# Patient Record
Sex: FEMALE | Race: WHITE
[De-identification: names, ages, dates, MRNs, and addresses within clinical notes are randomized per-mention and may not be internally consistent; named-entity substitution may affect disease eponyms.]

---

## 2021-08-07 ENCOUNTER — HOSPITAL ENCOUNTER (EMERGENCY)
Dept: HOSPITAL 52 - LL.ED | Age: 56
Discharge: HOME | End: 2021-08-07
Payer: COMMERCIAL

## 2021-08-07 DIAGNOSIS — R10.13: Primary | ICD-10-CM

## 2021-08-07 DIAGNOSIS — Z79.899: ICD-10-CM

## 2021-08-07 LAB
ANION GAP SERPL CALC-SCNC: 9.9 MEQ/L (ref 7–15)
CHLORIDE SERPL-SCNC: 107 MMOL/L (ref 98–107)
SODIUM SERPL-SCNC: 140 MMOL/L (ref 136–145)

## 2021-08-07 NOTE — EDM.PDOC
ED HPI GENERAL MEDICAL PROBLEM





- General


Chief Complaint: Abdominal Pain


Stated Complaint: Abdominal Pain


Time Seen by Provider: 08/07/21 16:25


Source of Information: Reports: Patient


History Limitations: Reports: No Limitations





- History of Present Illness


INITIAL COMMENTS - FREE TEXT/NARRATIVE: 





Pt. presents to ER with acute onset intermittent abdominal pain. Pt. states that

the discomfort started shortly before arrival to ER. She states that she has 

never had this pain before. Pt. states that she had a regular BM after pain 

started and feels that this helped intermittently, then it came back. Denies any

constipation, diarrhea, dark/tarry, or bloody stools. 


Pt. denies any wen colored stools or dark urine.


Pt. denies any chest pain, shortness of breath, fever or chills. Denies any 

history of previous abdominal surgeries.


She reported on my arrival in ER that pain was resolving on its own shortly 

after arriving to ER. 


Onset: Today


Onset Date: 08/07/21


Location: Reports: Abdomen


Associated Symptoms: Denies: Chest Pain, Cough, Diaphoresis, Fever/Chills, 

Malaise, Nausea/Vomiting, Shortness of Breath, Syncope, Weakness





- Related Data


                                    Allergies











Allergy/AdvReac Type Severity Reaction Status Date / Time


 


No Known Allergies Allergy   Verified 08/07/21 16:18











Home Meds: 


                                    Home Meds





Ibuprofen [Advil] 400 mg PO Q6HR PRN 06/16/14 [History]


amLODIPine Besylate [Amlodipine Besylate] 10 mg PO DAILY 08/07/21 [History]


lisinopriL [Lisinopril] 10 mg PO BID 08/07/21 [History]











Social & Family History





- Tobacco Use


Tobacco Use Status *Q: Unknown Ever Used Tobacco





ED ROS GENERAL





- Review of Systems


Review Of Systems: See Below


Constitutional: Reports: No Symptoms


HEENT: Reports: No Symptoms


Respiratory: Reports: No Symptoms


Cardiovascular: Reports: No Symptoms


Endocrine: Reports: No Symptoms


GI/Abdominal: Reports: Abdominal Pain.  Denies: Black Stool, Bloody Stool, 

Constipation, Diarrhea, Difficulty Swallowing, Distension, Hematemesis, 

Hematochezia, Melena


: Reports: No Symptoms


Musculoskeletal: Reports: No Symptoms


Skin: Reports: No Symptoms


Neurological: Reports: No Symptoms


Psychiatric: Reports: No Symptoms


Hematologic/Lymphatic: Reports: No Symptoms


Immunologic: Reports: No Symptoms





ED EXAM, GENERAL





- Physical Exam


Exam: See Below


Exam Limited By: No Limitations


General Appearance: Alert, WD/WN, No Apparent Distress


Respiratory/Chest: No Respiratory Distress, Lungs Clear, Normal Breath Sounds, 

No Accessory Muscle Use, Chest Non-Tender


Cardiovascular: Normal Peripheral Pulses, Regular Rate, Rhythm, No Edema, No JVD


Peripheral Pulses: 4+: Radial (L)


GI/Abdominal: Soft, No Organomegaly, No Distention, No Mass, Other (Pain about a

 1 on arrival to ER, not made worse with palpation.).  No: Hepatomegaly, 

Splenomegaly


 (Female) Exam: Deferred


Rectal (Female) Exam: No: Deferred


Extremities: Normal Inspection, Normal Range of Motion, Non-Tender, No Pedal 

Edema, Normal Capillary Refill


Neurological: Alert, Oriented, CN II-XII Intact, Normal Cognition, Normal 

Reflexes, No Motor/Sensory Deficits


Psychiatric: Normal Affect, Normal Mood


Skin Exam: Warm, Dry, Intact, Normal Color, No Rash


Lymphatic: No Adenopathy





Course





- Vital Signs


Last Recorded V/S: 





                                Last Vital Signs











Temp  37.1 C   08/07/21 16:25


 


Pulse  92   08/07/21 16:55


 


Resp  16   08/07/21 16:25


 


BP  152/88 H  08/07/21 16:55


 


Pulse Ox  100   08/07/21 16:25














- Orders/Labs/Meds


Orders: 





                               Active Orders 24 hr











 Category Date Time Status


 


 Gallbladder [Abdomen Ltd] [US] Stat Exams  08/07/21 17:17 Ordered


 


 Peripheral IV Insertion Adult [OM.PC] Routine Oth  08/07/21 16:29 Ordered











Labs: 





                                Laboratory Tests











  08/07/21 08/07/21 08/07/21 Range/Units





  16:40 16:40 16:40 


 


WBC  8.1    (4.0-10.2)  K/uL


 


RBC  4.34    (3.77-5.09)  M/uL


 


Hgb  13.0    (11.7-15.5)  g/dL


 


Hct  39.5    (34.0-46.0)  %


 


MCV  91.0    (84.0-98.0)  fL


 


MCH  30.0    (28.2-33.3)  pg


 


MCHC  32.9    (31.7-36.0)  g/dL


 


RDW  13.6    (11.2-14.1)  %


 


Plt Count  241    (150-350)  K/uL


 


Neut % (Auto)  65.9    (45.0-80.0)  %


 


Lymph % (Auto)  25.4    (10.0-50.0)  %


 


Mono % (Auto)  7.3    (2.0-14.0)  %


 


Eos % (Auto)  1.0    (0.0-5.0)  %


 


Baso % (Auto)  0.4    (0.0-2.0)  %


 


Neut # (Auto)  5.35    (1.40-7.00)  K/uL


 


Lymph # (Auto)  2.06    (0.50-3.50)  K/uL


 


Mono # (Auto)  0.59    (0.00-1.00)  K/uL


 


Eos # (Auto)  0.08    (0.00-0.50)  K/uL


 


Baso # (Auto)  0.03    (0.00-0.20)  K/uL


 


PT   9.3 L   (9.5-12.0)  SEC


 


INR   0.9   


 


APTT     (24.5-32.8)  SEC


 


Sodium    140  (136-145)  mmol/L


 


Potassium    3.6  (3.5-5.1)  mmol/L


 


Chloride    107  ()  mmol/L


 


Carbon Dioxide    23.1  (21.0-32.0)  mmol/L


 


Anion Gap    9.9  (7-15)  meq/L


 


BUN    11  (7-18)  mg/dL


 


Creatinine    1.25 H  (0.51-1.17)  mg/dL


 


Est Cr Clr Drug Dosing    TNP  


 


Estimated GFR (MDRD)    44  mL/min


 


Glucose    122 H  (70-99)  mg/dL


 


Lactic Acid     (0.4-2.0)  mmol/L


 


Calcium    8.6  (8.5-10.1)  mg/dL


 


Phosphorus    1.9 L  (2.6-4.7)  mg/dL


 


Magnesium    2.0  (1.8-2.4)  mg/dL


 


Total Bilirubin    0.5  (0.2-1.0)  mg/dL


 


AST    70 H  (15-37)  U/L


 


ALT    43  (12-78)  U/L


 


Alkaline Phosphatase    97  ()  IU/L


 


Troponin I High Sens    5  (<=51)  ng/L


 


C-Reactive Protein    1.1 H  (<=0.9)  mg/dL


 


Total Protein    7.7  (6.4-8.2)  g/dL


 


Albumin    3.6  (3.4-5.0)  g/dL


 


Amylase    40  ()  U/L


 


Lipase     ()  U/L


 


Specimen Type     


 


Urine Color     


 


Urine Appearance     


 


Urine pH     (5.0-9.0)  


 


Ur Specific Gravity     (1.005-1.030)  


 


Urine Protein     (NEGATIVE)  mg/dL


 


Urine Glucose (UA)     (NEGATIVE)  mg/dL


 


Urine Ketones     (NEGATIVE)  mg/dL


 


Urine Occult Blood     (NEGATIVE)  


 


Urine Nitrite     (NEGATIVE)  


 


Urine Bilirubin     (NEGATIVE)  


 


Urine Urobilinogen     (0.2-1.0)  E.U./dL


 


Ur Leukocyte Esterase     (NEGATIVE)  














  08/07/21 08/07/21 08/07/21 Range/Units





  16:40 16:40 16:40 


 


WBC     (4.0-10.2)  K/uL


 


RBC     (3.77-5.09)  M/uL


 


Hgb     (11.7-15.5)  g/dL


 


Hct     (34.0-46.0)  %


 


MCV     (84.0-98.0)  fL


 


MCH     (28.2-33.3)  pg


 


MCHC     (31.7-36.0)  g/dL


 


RDW     (11.2-14.1)  %


 


Plt Count     (150-350)  K/uL


 


Neut % (Auto)     (45.0-80.0)  %


 


Lymph % (Auto)     (10.0-50.0)  %


 


Mono % (Auto)     (2.0-14.0)  %


 


Eos % (Auto)     (0.0-5.0)  %


 


Baso % (Auto)     (0.0-2.0)  %


 


Neut # (Auto)     (1.40-7.00)  K/uL


 


Lymph # (Auto)     (0.50-3.50)  K/uL


 


Mono # (Auto)     (0.00-1.00)  K/uL


 


Eos # (Auto)     (0.00-0.50)  K/uL


 


Baso # (Auto)     (0.00-0.20)  K/uL


 


PT     (9.5-12.0)  SEC


 


INR     


 


APTT  24.1 L    (24.5-32.8)  SEC


 


Sodium     (136-145)  mmol/L


 


Potassium     (3.5-5.1)  mmol/L


 


Chloride     ()  mmol/L


 


Carbon Dioxide     (21.0-32.0)  mmol/L


 


Anion Gap     (7-15)  meq/L


 


BUN     (7-18)  mg/dL


 


Creatinine     (0.51-1.17)  mg/dL


 


Est Cr Clr Drug Dosing     


 


Estimated GFR (MDRD)     mL/min


 


Glucose     (70-99)  mg/dL


 


Lactic Acid   1.7   (0.4-2.0)  mmol/L


 


Calcium     (8.5-10.1)  mg/dL


 


Phosphorus     (2.6-4.7)  mg/dL


 


Magnesium     (1.8-2.4)  mg/dL


 


Total Bilirubin     (0.2-1.0)  mg/dL


 


AST     (15-37)  U/L


 


ALT     (12-78)  U/L


 


Alkaline Phosphatase     ()  IU/L


 


Troponin I High Sens     (<=51)  ng/L


 


C-Reactive Protein     (<=0.9)  mg/dL


 


Total Protein     (6.4-8.2)  g/dL


 


Albumin     (3.4-5.0)  g/dL


 


Amylase     ()  U/L


 


Lipase    99  ()  U/L


 


Specimen Type     


 


Urine Color     


 


Urine Appearance     


 


Urine pH     (5.0-9.0)  


 


Ur Specific Gravity     (1.005-1.030)  


 


Urine Protein     (NEGATIVE)  mg/dL


 


Urine Glucose (UA)     (NEGATIVE)  mg/dL


 


Urine Ketones     (NEGATIVE)  mg/dL


 


Urine Occult Blood     (NEGATIVE)  


 


Urine Nitrite     (NEGATIVE)  


 


Urine Bilirubin     (NEGATIVE)  


 


Urine Urobilinogen     (0.2-1.0)  E.U./dL


 


Ur Leukocyte Esterase     (NEGATIVE)  














  08/07/21 Range/Units





  16:55 


 


WBC   (4.0-10.2)  K/uL


 


RBC   (3.77-5.09)  M/uL


 


Hgb   (11.7-15.5)  g/dL


 


Hct   (34.0-46.0)  %


 


MCV   (84.0-98.0)  fL


 


MCH   (28.2-33.3)  pg


 


MCHC   (31.7-36.0)  g/dL


 


RDW   (11.2-14.1)  %


 


Plt Count   (150-350)  K/uL


 


Neut % (Auto)   (45.0-80.0)  %


 


Lymph % (Auto)   (10.0-50.0)  %


 


Mono % (Auto)   (2.0-14.0)  %


 


Eos % (Auto)   (0.0-5.0)  %


 


Baso % (Auto)   (0.0-2.0)  %


 


Neut # (Auto)   (1.40-7.00)  K/uL


 


Lymph # (Auto)   (0.50-3.50)  K/uL


 


Mono # (Auto)   (0.00-1.00)  K/uL


 


Eos # (Auto)   (0.00-0.50)  K/uL


 


Baso # (Auto)   (0.00-0.20)  K/uL


 


PT   (9.5-12.0)  SEC


 


INR   


 


APTT   (24.5-32.8)  SEC


 


Sodium   (136-145)  mmol/L


 


Potassium   (3.5-5.1)  mmol/L


 


Chloride   ()  mmol/L


 


Carbon Dioxide   (21.0-32.0)  mmol/L


 


Anion Gap   (7-15)  meq/L


 


BUN   (7-18)  mg/dL


 


Creatinine   (0.51-1.17)  mg/dL


 


Est Cr Clr Drug Dosing   


 


Estimated GFR (MDRD)   mL/min


 


Glucose   (70-99)  mg/dL


 


Lactic Acid   (0.4-2.0)  mmol/L


 


Calcium   (8.5-10.1)  mg/dL


 


Phosphorus   (2.6-4.7)  mg/dL


 


Magnesium   (1.8-2.4)  mg/dL


 


Total Bilirubin   (0.2-1.0)  mg/dL


 


AST   (15-37)  U/L


 


ALT   (12-78)  U/L


 


Alkaline Phosphatase   ()  IU/L


 


Troponin I High Sens   (<=51)  ng/L


 


C-Reactive Protein   (<=0.9)  mg/dL


 


Total Protein   (6.4-8.2)  g/dL


 


Albumin   (3.4-5.0)  g/dL


 


Amylase   ()  U/L


 


Lipase   ()  U/L


 


Specimen Type  Urincc  


 


Urine Color  Yellow  


 


Urine Appearance  Clear  


 


Urine pH  7.0  (5.0-9.0)  


 


Ur Specific Gravity  1.015  (1.005-1.030)  


 


Urine Protein  Negative  (NEGATIVE)  mg/dL


 


Urine Glucose (UA)  Negative  (NEGATIVE)  mg/dL


 


Urine Ketones  Negative  (NEGATIVE)  mg/dL


 


Urine Occult Blood  Negative  (NEGATIVE)  


 


Urine Nitrite  Negative  (NEGATIVE)  


 


Urine Bilirubin  Negative  (NEGATIVE)  


 


Urine Urobilinogen  0.2  (0.2-1.0)  E.U./dL


 


Ur Leukocyte Esterase  Negative  (NEGATIVE)  











Meds: 





Medications














Discontinued Medications














Generic Name Dose Route Start Last Admin





  Trade Name Norris  PRN Reason Stop Dose Admin


 


Sodium Chloride  10 ml  08/07/21 16:28 





  Sodium Chloride 0.9% 10 Ml Syringe  FLUSH  





  ASDIRECTED PRN  





  Keep Vein Open  














Departure





- Departure


Time of Disposition: 17:45


Disposition: Home, Self-Care 01


Clinical Impression: 


 Epigastric pain








- Discharge Information


Instructions:  Abdominal Pain, Adult


Referrals: 


Juana Davila PA [Primary Care Provider] - 


Forms:  ED Department Discharge


Additional Instructions: 


Home to rest.





Return to ER if the pain comes back and doesn't go away.





You will be contacted regarding an appointment for a gallbladder ultrasound next

week.





Sepsis Event Note (ED)





- Evaluation


Sepsis Screening Result: No Definite Risk





- Focused Exam


Vital Signs: 





                                   Vital Signs











  Temp Pulse Resp BP Pulse Ox


 


 08/07/21 16:55   92   152/88 H 


 


 08/07/21 16:25  37.1 C  91  16  161/88 H  100














- Problem List Review


Problem List Initiated/Reviewed/Updated: Yes





- My Orders


Last 24 Hours: 





My Active Orders





08/07/21 16:29


Peripheral IV Insertion Adult [OM.PC] Routine 





08/07/21 17:17


Gallbladder [Abdomen Ltd] [US] Stat 














- Assessment/Plan


Last 24 Hours: 





My Active Orders





08/07/21 16:29


Peripheral IV Insertion Adult [OM.PC] Routine 





08/07/21 17:17


Gallbladder [Abdomen Ltd] [US] Stat 











Plan: 





Home to rest.





Return to ER if the pain comes back and doesn't go away.





You will be contacted regarding an appointment for a gallbladder ultrasound next

 week.

## 2022-01-20 ENCOUNTER — HOSPITAL ENCOUNTER (OUTPATIENT)
Dept: HOSPITAL 52 - LL.SDS | Age: 57
Discharge: HOME | End: 2022-01-20
Attending: SURGERY
Payer: COMMERCIAL

## 2022-01-20 DIAGNOSIS — Z79.899: ICD-10-CM

## 2022-01-20 DIAGNOSIS — K63.5: ICD-10-CM

## 2022-01-20 DIAGNOSIS — Z12.11: Primary | ICD-10-CM

## 2022-01-20 DIAGNOSIS — E78.5: ICD-10-CM

## 2022-01-20 DIAGNOSIS — E66.09: ICD-10-CM

## 2022-01-20 DIAGNOSIS — D12.8: ICD-10-CM

## 2022-01-20 DIAGNOSIS — K57.30: ICD-10-CM

## 2022-01-20 DIAGNOSIS — I10: ICD-10-CM
